# Patient Record
Sex: FEMALE | Race: WHITE
[De-identification: names, ages, dates, MRNs, and addresses within clinical notes are randomized per-mention and may not be internally consistent; named-entity substitution may affect disease eponyms.]

---

## 2018-08-06 ENCOUNTER — HOSPITAL ENCOUNTER (OUTPATIENT)
Dept: HOSPITAL 47 - RADMAMWWP | Age: 48
Discharge: HOME | End: 2018-08-06
Payer: OTHER GOVERNMENT

## 2018-08-06 DIAGNOSIS — Z12.31: Primary | ICD-10-CM

## 2018-08-06 PROCEDURE — 77067 SCR MAMMO BI INCL CAD: CPT

## 2018-08-08 NOTE — MM
Reason for exam: screening  (asymptomatic).

Last mammogram was performed 5 years and 2 months ago.



History:

Patient is nulliparous.

Family history of breast cancer in grandmother.

U/S LT Cancelled Core of both breasts, June 19, 2013.  Benign cyst aspiration of 

the left breast.

Took hormonal contraceptives for 2 years beginning at age 16.



Physical Findings:

A clinical breast exam by your physician is recommended on an annual basis and 

results should be correlated with mammographic findings.



MG Screening Mammo w CAD

Bilateral CC and MLO view(s) were taken.

Prior study comparison: June 11, 2013, CAD bilateral diagnostic mammogram.  

September 29, 2009, bilateral diagnostic digital mammog.

There are scattered fibroglandular densities.  Small grouped calcifications 

posterior superior right breast only seen on MLO view.





ASSESSMENT: Incomplete: need additional imaging evaluation, BI-RAD 0



RECOMMENDATION:

Special view mammogram of the right breast.



[facility] will attempt to contact patient to return for supplemental 

views.

## 2018-09-10 ENCOUNTER — HOSPITAL ENCOUNTER (OUTPATIENT)
Dept: HOSPITAL 47 - RADECHMAIN | Age: 48
Discharge: HOME | End: 2018-09-10
Payer: OTHER GOVERNMENT

## 2018-09-10 ENCOUNTER — HOSPITAL ENCOUNTER (OUTPATIENT)
Dept: HOSPITAL 47 - RADMAMWWP | Age: 48
Discharge: HOME | End: 2018-09-10
Payer: OTHER GOVERNMENT

## 2018-09-10 DIAGNOSIS — R00.0: ICD-10-CM

## 2018-09-10 DIAGNOSIS — R92.8: Primary | ICD-10-CM

## 2018-09-10 DIAGNOSIS — R60.9: ICD-10-CM

## 2018-09-10 DIAGNOSIS — D25.1: Primary | ICD-10-CM

## 2018-09-10 PROCEDURE — 76830 TRANSVAGINAL US NON-OB: CPT

## 2018-09-10 PROCEDURE — 93306 TTE W/DOPPLER COMPLETE: CPT

## 2018-09-10 PROCEDURE — 77065 DX MAMMO INCL CAD UNI: CPT

## 2018-09-10 PROCEDURE — 77061 BREAST TOMOSYNTHESIS UNI: CPT

## 2018-09-10 PROCEDURE — 76856 US EXAM PELVIC COMPLETE: CPT

## 2018-09-10 NOTE — MM
Reason for exam: additional evaluation requested from abnormal screening.

Last mammogram was performed 1 month ago.



History:

Patient is nulliparous.

Family history of breast cancer in grandmother.

U/S LT Cancelled Core of both breasts, June 19, 2013.  Benign cyst aspiration of 

the left breast.

Took hormonal contraceptives for 2 years beginning at age 16.



Physical Findings:

Nurse did not find any significant physical abnormalities on exam.



MG 3D Work Up W/Cad RT

XCCL with magnification, ML with magnification, ML, and XCCL view(s) were taken of

the right breast.

Prior study comparison: August 6, 2018, bilateral MG screening mammo w CAD.  June 11, 2013, CAD bilateral diagnostic mammogram.

The breast tissue is heterogeneously dense. This may lower the sensitivity of 

mammography.  There are indeterminate calcifications in the upper outer right 

breast.



These results were verbally communicated with the patient and result sheet given 

to the patient on 9/10/18.





ASSESSMENT: Suspicious, BI-RAD 4



RECOMMENDATION:

Stereotactic core biopsy of the right breast.



Called with mammographic findings and has scheduled an appointment for the patient

for 9/13/18 at 1:00 with [facility] Bab[facility].



PRELIMINARY REPORT CALLED AND FAXED TO PAM BABCOCK ON 9/10/18.

## 2018-09-11 NOTE — US
EXAMINATION TYPE: US pelvis complete transvag

 

DATE OF EXAM: 9/10/2018

 

COMPARISON: NONE

 

CLINICAL HISTORY: R60.9 Edema,N84.1 Cervical lesion.

 

TECHNIQUE:  Transvaginal (TV) and Transabdominal (TA) .  Transabdominal sonographic images of the pel
vis were acquired.  Transvaginal sonographic images were medically necessary to better assess the fol
lowing anatomy: As per order

 

Date of LMP:  08/01/2018

 

EXAM MEASUREMENTS:

 

Uterus:  8.7 x 4.6 x 4.8 cm

Endometrial Stripe: 1.0 cm

Right Ovary:  1.8 x 1.4 x 2.5   cm

Left Ovary:  2.4 x 1.3 x 1.6  cm

 

 

 

1. Uterus:  Anteverted   Parenchymal changes indicative of fibroids, largest posterior fundus 2.0 x 2
.2 x 2.3 cm

2. Endometrium:  1.0 cm  within normal limits for secretory phase of menstrual cycle

3. Right Ovary:  wnl

4. Left Ovary:  wnl

5. Bilateral Adnexa:  wnl

6. Posterior cul-de-sac:  no free fluid

 

Technologist marks some vague hypoechoic lesions in the uterine myometrium felt to reflect small fibr
oids seen better on transvaginal images towards end of study. Right ovary is not as well seen versus 
left ovary on images saved.

 

IMPRESSION: No suspicious lesions seen in cervix on images saved. Scattered small intramural uterine 
fibroids present. They can BE better evaluated and characterized with pelvic MRI if desired.

## 2018-09-12 NOTE — ECHOF
Referral Reason:R60.9 Edema,



MEASUREMENTS

--------

HEIGHT: 182.9 cm

WEIGHT: 97.5 kg

BP: 

RVIDd:   2.8 cm     (< 3.3)

IVSd:   1.0 cm     (0.6 - 1.1)

LVIDd:   4.5 cm     (3.9 - 5.3)

LVPWd:   1.1 cm     (0.6 - 1.1)

IVSs:   1.3 cm

LVIDs:   2.9 cm

LVPWs:   1.6 cm

LAESV Index (A-L):   26.82 ml/m

Ao Diam:   3.0 cm     (2.0 - 3.7)

AV Cusp:   1.7 cm     (1.5 - 2.6)

LA Diam:   3.1 cm     (2.7 - 3.8)

MV EXCURSION:   7.983 mm     (> 18.000)

MV EF SLOPE:   66 mm/s     (70 - 150)

EPSS:   1.1 cm

MV E Ramses:   0.79 m/s

MV DecT:   236 ms

MV A Ramses:   0.54 m/s

MV E/A Ratio:   1.46 

RAP:   5.00 mmHg

RVSP:   17.37 mmHg







FINDINGS

--------

Sinus rhythm.

This was a technically good study.

The left ventricular size is normal.   Left ventricular wall thickness is normal.   Overall left vent
ricular systolic function is normal with, an EF between 55 - 60 %.

The right ventricle is normal in size.

The left atrial size is normal.

The right atrium is normal in size.

The aortic valve is trileaflet, and appears structurally normal. No aortic stenosis or regurgitation.


There is trace mitral regurgitation.

Trace tricuspid regurgitation present.   The right ventricular systolic pressure, as measured by Dopp
ler, is 17.37mmHg.

Pulmonic valve appears structurally normal.

The aortic root size is normal.

Normal inferior vena cava with normal inspiratory collapse consistent with estimated right atrial pre
ssure of  5 mmHg.

The pericardium is normal.



CONCLUSIONS

--------

1. Sinus rhythm.

2. This was a technically good study.

3. The left ventricular size is normal.

4. Left ventricular wall thickness is normal.

5. Overall left ventricular systolic function is normal with, an EF between 55 - 60 %.

6. The right ventricle is normal in size.

7. The left atrial size is normal.

8. The right atrium is normal in size.

9. The aortic valve is trileaflet, and appears structurally normal. No aortic stenosis or regurgitati
on.

10. There is trace mitral regurgitation.

11. Trace tricuspid regurgitation present.

12. The right ventricular systolic pressure, as measured by Doppler, is 17.37mmHg.

13. Pulmonic valve appears structurally normal.

14. The aortic root size is normal.

15. Normal inferior vena cava with normal inspiratory collapse consistent with estimated right atrial
 pressure of  5 mmHg.

16. The pericardium is normal.





SONOGRAPHER: Asia Strickland RDCS

## 2018-09-27 ENCOUNTER — HOSPITAL ENCOUNTER (OUTPATIENT)
Dept: HOSPITAL 47 - RADMAMWWP | Age: 48
End: 2018-09-27
Payer: OTHER GOVERNMENT

## 2018-09-27 VITALS — BODY MASS INDEX: 29.8 KG/M2

## 2018-09-27 VITALS — RESPIRATION RATE: 16 BRPM

## 2018-09-27 VITALS — SYSTOLIC BLOOD PRESSURE: 118 MMHG | DIASTOLIC BLOOD PRESSURE: 78 MMHG | TEMPERATURE: 98.5 F | HEART RATE: 72 BPM

## 2018-09-27 DIAGNOSIS — N60.21: ICD-10-CM

## 2018-09-27 DIAGNOSIS — Z88.8: ICD-10-CM

## 2018-09-27 DIAGNOSIS — N60.91: Primary | ICD-10-CM

## 2018-09-27 DIAGNOSIS — Z91.040: ICD-10-CM

## 2018-09-27 DIAGNOSIS — R92.1: ICD-10-CM

## 2018-09-27 DIAGNOSIS — N60.11: ICD-10-CM

## 2018-09-27 PROCEDURE — 88305 TISSUE EXAM BY PATHOLOGIST: CPT

## 2018-09-27 PROCEDURE — 19081 BX BREAST 1ST LESION STRTCTC: CPT

## 2018-09-27 NOTE — MM
EXAMINATION TYPE: MG stereo VAD BX RT

 

DATE OF EXAM: 9/27/2018

 

COMPARISON: 8/6/2018 and 9/10/2018

 

CLINICAL HISTORY: 47-year-old female referred for biopsy of new right breast 
microcalcifications.

 

TECHNIQUE: Stereotactic guided core biopsy of posterior upper outer quadrant 
right breast.  

 

FINDINGS: The procedure of stereotactic guided core biopsy was explained to the 
patient.  Benefits, alternatives, and risks were discussed.  An informed 
consent was then obtained.  

 

The [facility] pathway [facility] biopsy [facility]  [facility]. I performed the localization followed by the remainder of the 
procedure. A vacuum assisted biopsy gun was used to obtain four core samples.   

 

The patient tolerated the procedure well without any immediate complication.  
The patient was kept in the radiology department for short stay after the 
procedure and then discharged home in stable condition.  Targeted 
calcifications are identified in specimen mammogram.  

 

Post biopsy mammogram shows the clip to appear in satisfactory position 
relative to the targeted area of concern on the preprocedure images.  

 

IMPRESSION: 

 

SUCCESSFUL, UNCOMPLICATED STEREOTACTIC GUIDED CORE BIOPSY OF NEW POSTERIOR 
UPPER OUTER QUADRANT RIGHT BREAST MICROCALCIFICATIONS; FULL PATHOLOGY RESULTS 
TO FOLLOW.  

 

Pathology Results: High Risk



BREAST, RIGHT, STEREOTACTIC CORE BIOPSY:  Atypical ductal hyperplasia (ADH) 
with associated calcifications.  Fibrocystic changes including fibrosis, cysts, 
columnar cell hyperplasia, adenosis and calcifications.  



Recommendation

Surgical consult of the right breast.
INEZD

## 2018-10-24 ENCOUNTER — HOSPITAL ENCOUNTER (OUTPATIENT)
Dept: HOSPITAL 47 - RADUSWWP | Age: 48
End: 2018-10-24
Payer: OTHER GOVERNMENT

## 2018-10-24 DIAGNOSIS — I87.2: Primary | ICD-10-CM

## 2018-10-24 DIAGNOSIS — R22.9: ICD-10-CM

## 2018-10-24 PROCEDURE — 71270 CT THORAX DX C-/C+: CPT

## 2018-10-24 PROCEDURE — 93970 EXTREMITY STUDY: CPT

## 2018-10-24 NOTE — CT
EXAMINATION TYPE: CT chest wo/w con

 

DATE OF EXAM: 10/24/2018

 

COMPARISON: Prior mammograms dated 6/11/2013 and 8/6/2018.

 

HISTORY: Left lump near clavicle area x 2 months, bilateral leg swelling

 

CT DLP: 974 mGycm.   Automated Exposure Control for Dose Reduction was Utilized.

 

 

TECHNIQUE:  CT scan of the thorax is performed following with IV Contrast, patient injected with 100 
mL of Isovue 300.

 

FINDINGS:

 

LUNGS: The lungs are grossly clear, there is no concerning parenchymal mass or nodule identified.   T
here is no pleural effusion or pneumothorax seen.  The tracheobronchial tree is patent.

 

MEDIASTINUM: Strand-like superior anterior mediastinal probable residual thymic tissue is seen. There
 are no greater than 1 cm hilar or mediastinal lymph nodes.   No pericardial effusion is seen.  

 

OTHER: No cholelithiasis. In the visualized kidneys no nephrolithiasis. Solitary too small to accurat
ely characterize hepatic lesion is seen on image 66 measuring 8 mm. No suspicious finding surrounding
 the left clavicle to correspond with the patient's palpable abnormality. Minimal multilevel degenera
tive changes of the thoracic spine are present.

 

IMPRESSION: 

1. No suspicious finding to correspond to the patient's palpable abnormality along the left clavicle.
 No suspicious osseous lesion. There is further clinical concern targeted ultrasound could be perform
ed.

2. 6 mm right breast lesion near the 6:00 position for which right breast ultrasound is initially rec
ommended and if no correlate diagnostic mammography would be recommended.

## 2018-10-24 NOTE — US
EXAMINATION TYPE: US venous doppler duplex LE 

 

DATE OF EXAM: 10/24/2018 3:15 PM

 

COMPARISON: NONE

 

CLINICAL HISTORY: I78.2 VENOUS INSUFFICIENCY. Edema bilateral legs, worse on the left. Venous insuffi
ciency.

 

LOWER EXTREMITY VENOUS INSUFFICIENCY

 

SIDE PERFORMED:  bilateral   

 

1)  Color flow is present and patency is documented in the following vessels.  No DVT or SVT is noted
.       

   EIV           

   Common Femoral Vein    

   Deep Femoral Vein

   Femoral Vein

   Popliteal Vein

   Proximal Calf Veins

 

   Greater Saph Vein  

   Upper Small Saph Vein  

 

2)  There is venous reflux noted at the following venous levels:  no evidence of reflux at this time 


 

 

 

Grayscale, color Doppler, spectral Doppler imaging performed of the deep veins of the lower extremiti
es.

 

Provocative maneuvers performed.

 

IMPRESSION: Venous insufficiency is not evident.

## 2018-11-05 ENCOUNTER — HOSPITAL ENCOUNTER (OUTPATIENT)
Dept: HOSPITAL 47 - OR | Age: 48
Discharge: HOME | End: 2018-11-05
Attending: SURGERY
Payer: OTHER GOVERNMENT

## 2018-11-05 VITALS — HEART RATE: 108 BPM | SYSTOLIC BLOOD PRESSURE: 103 MMHG | DIASTOLIC BLOOD PRESSURE: 63 MMHG

## 2018-11-05 VITALS — BODY MASS INDEX: 31.5 KG/M2

## 2018-11-05 VITALS — RESPIRATION RATE: 18 BRPM

## 2018-11-05 VITALS — TEMPERATURE: 98 F

## 2018-11-05 DIAGNOSIS — Z91.040: ICD-10-CM

## 2018-11-05 DIAGNOSIS — G47.00: ICD-10-CM

## 2018-11-05 DIAGNOSIS — L82.1: ICD-10-CM

## 2018-11-05 DIAGNOSIS — E23.2: ICD-10-CM

## 2018-11-05 DIAGNOSIS — E27.40: ICD-10-CM

## 2018-11-05 DIAGNOSIS — N60.31: Primary | ICD-10-CM

## 2018-11-05 DIAGNOSIS — Z79.890: ICD-10-CM

## 2018-11-05 DIAGNOSIS — E55.9: ICD-10-CM

## 2018-11-05 DIAGNOSIS — E78.5: ICD-10-CM

## 2018-11-05 DIAGNOSIS — Z88.8: ICD-10-CM

## 2018-11-05 DIAGNOSIS — E03.9: ICD-10-CM

## 2018-11-05 DIAGNOSIS — F41.9: ICD-10-CM

## 2018-11-05 DIAGNOSIS — Z91.018: ICD-10-CM

## 2018-11-05 DIAGNOSIS — Z80.3: ICD-10-CM

## 2018-11-05 DIAGNOSIS — F32.9: ICD-10-CM

## 2018-11-05 DIAGNOSIS — B37.2: ICD-10-CM

## 2018-11-05 PROCEDURE — 19125 EXCISION BREAST LESION: CPT

## 2018-11-05 PROCEDURE — 11401 EXC TR-EXT B9+MARG 0.6-1 CM: CPT

## 2018-11-05 PROCEDURE — 88307 TISSUE EXAM BY PATHOLOGIST: CPT

## 2018-11-05 PROCEDURE — 76098 X-RAY EXAM SURGICAL SPECIMEN: CPT

## 2018-11-05 PROCEDURE — 19281 PERQ DEVICE BREAST 1ST IMAG: CPT

## 2018-11-05 PROCEDURE — 12031 INTMD RPR S/A/T/EXT 2.5 CM/<: CPT

## 2018-11-05 PROCEDURE — 88305 TISSUE EXAM BY PATHOLOGIST: CPT

## 2018-11-05 PROCEDURE — 81025 URINE PREGNANCY TEST: CPT

## 2018-11-05 RX ADMIN — POTASSIUM CHLORIDE SCH MLS: 14.9 INJECTION, SOLUTION INTRAVENOUS at 13:03

## 2018-11-05 RX ADMIN — POTASSIUM CHLORIDE SCH MLS: 14.9 INJECTION, SOLUTION INTRAVENOUS at 08:56

## 2018-11-05 RX ADMIN — POTASSIUM CHLORIDE SCH MLS: 14.9 INJECTION, SOLUTION INTRAVENOUS at 12:54

## 2018-11-05 NOTE — P.OP
Date of Procedure: 11/05/18


Procedure(s) Performed: 


PREOPERATIVE DIAGNOSIS: Abnormal right mammogram, right breast nevus


POSTOPERATIVE DIAGNOSIS: Same


PROCEDURE: Right Breast wire localization biopsy, excision right breast nevus


SURGEON: Samantha


EBL: Minimal


ANESTHESIA: General


COMPLICATIONS: None


OPERATIVE PROCEDURE: Patient was placed on the operating room table in the 

supine position.  The patient's breast was prepped and draped in usual sterile 

fashion.  The patient's nevus present at 9:00 was first addressed.  An 

elliptical incision was made around the nevus and this was excised sharply.  

The subcutaneous tissues were closed using 3-0 Vicryl sutures and the skin 

using interrupted 4-0 Monocryl sutures.  Next the wire entrance site at 10:00 

near the axilla was addressed. A curvilinear incision was made adjacent to the 

wire entrance site.  I followed the wire down into the breast tissue.  The 

breast tissue around the tip of the wire was fully excised using 

electrocautery.  The specimen was sent for specimen radiogram.  The clip was 

present within the specimen.  The subcutaneous tissues were inspected.  No 

bleeding was seen.  The subcutaneous tissues were closed using 3-0 Vicryl 

sutures.  The skin was closed using a running 4-0 Monocryl stitch.  Skin glue 

was then applied.


DISPOSITION: Stable to recovery room none

## 2018-11-05 NOTE — MM
EXAMINATION TYPE: MG pre op needle loc RT, MG surgical specimen RT

 

DATE OF EXAM: 11/5/2018 10:24 AM

 

COMPARISON: NONE

 

HISTORY: High risk lesion on recent stereotactic core biopsy

 

Informed consent was obtained and all the patient's questions were answered.  
The clip in question was localized mammographically.   The standard sterile 
technique was utilized, as well as appropriate local anesthesia with 1% 
Lidocaine and bicarbonate.  Localization needle followed by placement of a 
guidewire was performed under mammographic guidance. Verification images 
demonstrate appropriate deployment of the guidewire.  The patient tolerated the 
procedure well and left the department in stable condition.  

 

Specimen radiograph demonstrates the clip in question to reside within the 
specimen.  

 

IMPRESSION: Successful needle localization and open biopsy right breast with 
pathology results pending.



Pathology Results: Benign

 

A. SKIN LESION OF RIGHT BREAST, EXCISION: Seborrheic keratosis.



B. RIGHT BREAST, EXCISIONAL BIOPSY: Benign fibrofatty breast parenchyma with 
fibrocystic spectrum changes including duct cystic changes, stromal fibrosis 
and prior biopsy site artifact. Intraductal mineralizations are identified. 
Residual atypical duct hyperplasia is not identified. 



Recommendation

Follow up mammogram of the right breast in 6 months.
INEZD

## 2018-11-08 NOTE — CDI
Outpatient Documentation Clarification Form





Date: 11/8/18



CDS/ Name: Letty Duron

Phone: If any questions, call Amira Mcguire  at [phone]



Patient Name: Ashleigh Gavin

Account Number: [id number]

Admit Date:  11/5/18   

Discharge Date: 11/5/18

   

ATTENTION: The [facility] Coding Staff appreciate your assistance in clarifying 
documentation. Please respond to the clarification below the line at the bottom 
and electronically sign. The [facility] Coding staff will review the response and 
follow-up if needed. Please note: Queries are made part of the [employer] 
Record. If you have any questions, please contact the .



Dear Dr. Singh,



What is the size of nevus excision, in cm?



What is the size of closure/repair following the nevus excision, in cm?





Thank you for your kind consideration.

Nevus size 1 cm, closure 2.5 cm ________________________________________________













MTDD

## 2023-01-10 ENCOUNTER — HOSPITAL ENCOUNTER (OUTPATIENT)
Dept: HOSPITAL 47 - CPPFTMAIN | Age: 53
End: 2023-01-10
Attending: CLINIC/CENTER
Payer: OTHER GOVERNMENT

## 2023-01-10 DIAGNOSIS — Z91.040: ICD-10-CM

## 2023-01-10 DIAGNOSIS — R06.02: Primary | ICD-10-CM

## 2023-01-10 DIAGNOSIS — Z87.891: ICD-10-CM

## 2023-01-10 DIAGNOSIS — Z88.8: ICD-10-CM

## 2023-01-10 PROCEDURE — 94726 PLETHYSMOGRAPHY LUNG VOLUMES: CPT

## 2023-01-10 PROCEDURE — 94060 EVALUATION OF WHEEZING: CPT

## 2023-01-10 PROCEDURE — 94729 DIFFUSING CAPACITY: CPT
